# Patient Record
Sex: FEMALE | Race: WHITE
[De-identification: names, ages, dates, MRNs, and addresses within clinical notes are randomized per-mention and may not be internally consistent; named-entity substitution may affect disease eponyms.]

---

## 2017-02-09 ENCOUNTER — HOSPITAL ENCOUNTER (OUTPATIENT)
Dept: HOSPITAL 19 - MC.RAD | Age: 62
End: 2017-02-09
Attending: OBSTETRICS & GYNECOLOGY
Payer: COMMERCIAL

## 2017-02-09 DIAGNOSIS — Z12.31: Primary | ICD-10-CM

## 2018-03-29 ENCOUNTER — HOSPITAL ENCOUNTER (OUTPATIENT)
Dept: HOSPITAL 19 - MC.RAD | Age: 63
End: 2018-03-29
Attending: OBSTETRICS & GYNECOLOGY
Payer: COMMERCIAL

## 2018-03-29 DIAGNOSIS — Z12.31: Primary | ICD-10-CM

## 2019-04-11 ENCOUNTER — HOSPITAL ENCOUNTER (OUTPATIENT)
Dept: HOSPITAL 19 - MC.RAD | Age: 64
End: 2019-04-11
Payer: COMMERCIAL

## 2019-04-11 DIAGNOSIS — Z12.31: Primary | ICD-10-CM

## 2020-08-24 ENCOUNTER — HOSPITAL ENCOUNTER (OUTPATIENT)
Dept: HOSPITAL 6 - RAD | Age: 65
End: 2020-08-24
Payer: COMMERCIAL

## 2020-08-24 DIAGNOSIS — M71.21: Primary | ICD-10-CM

## 2024-01-17 ENCOUNTER — HOSPITAL ENCOUNTER (OUTPATIENT)
Dept: HOSPITAL 19 - SDCO | Age: 69
Discharge: HOME | End: 2024-01-17
Attending: INTERNAL MEDICINE
Payer: MEDICARE

## 2024-01-17 VITALS — DIASTOLIC BLOOD PRESSURE: 75 MMHG | SYSTOLIC BLOOD PRESSURE: 128 MMHG | HEART RATE: 62 BPM

## 2024-01-17 VITALS — DIASTOLIC BLOOD PRESSURE: 68 MMHG | SYSTOLIC BLOOD PRESSURE: 107 MMHG | HEART RATE: 63 BPM | TEMPERATURE: 97.8 F

## 2024-01-17 VITALS — SYSTOLIC BLOOD PRESSURE: 134 MMHG | HEART RATE: 67 BPM | DIASTOLIC BLOOD PRESSURE: 78 MMHG

## 2024-01-17 VITALS — HEART RATE: 64 BPM | TEMPERATURE: 98.8 F | DIASTOLIC BLOOD PRESSURE: 77 MMHG | SYSTOLIC BLOOD PRESSURE: 130 MMHG

## 2024-01-17 VITALS — WEIGHT: 256.4 LBS | BODY MASS INDEX: 43.77 KG/M2 | HEIGHT: 64 IN

## 2024-01-17 DIAGNOSIS — D17.5: ICD-10-CM

## 2024-01-17 DIAGNOSIS — D12.4: ICD-10-CM

## 2024-01-17 DIAGNOSIS — Z87.891: ICD-10-CM

## 2024-01-17 DIAGNOSIS — G47.33: ICD-10-CM

## 2024-01-17 DIAGNOSIS — K57.30: ICD-10-CM

## 2024-01-17 DIAGNOSIS — Z12.11: Primary | ICD-10-CM

## 2024-01-17 NOTE — NUR
[id number]: PT TO RECOVERY BAY 2 FROM ENDO S/P COLONOSCOPY WITH POLYPECTOMY
A&O, PLACED ON MONITOR, VSS ON RA
RECEIVED REPORT AND ASSUMED CARE OF PT FROM MONIQUE HALE
SPOUSE AT BEDSIDE
PROVIDED FOOD&FLUIDS, TOLERATING WELL
DR SANTIAGO IN TO SPEAK WITH PT POST PROCEDURE
PT HAS REMAINED A&O, NAD, VSS ON RA, TOLERATING PO, IS WITHOUT SIGNIFICANT
COMPLAINT, WITH STEADY GAIT THRU OUT STAY
IV D/C'D. D/C INSTRUCTIONS, ANY FOLLOW UP REVIEWED AND HANDED TO PT.  ALL
QUESTIONS AND CONCERNS ADDRESSED TO PT SATISFACTION.
TAKEN TO EXIT VIA W/C WITH ALL BELONGINGS AND PAPERWORK IN HAND, ASSISTED INTO
PASSENGER SEAT OF POV.  SPOUSE TO DRIVE HOME.